# Patient Record
Sex: MALE | Employment: OTHER | URBAN - METROPOLITAN AREA
[De-identification: names, ages, dates, MRNs, and addresses within clinical notes are randomized per-mention and may not be internally consistent; named-entity substitution may affect disease eponyms.]

---

## 2018-08-30 ENCOUNTER — HOSPITAL ENCOUNTER (OUTPATIENT)
Dept: ULTRASOUND IMAGING | Age: 28
Discharge: HOME | End: 2018-08-30
Attending: UROLOGY | Admitting: UROLOGY

## 2018-08-30 DIAGNOSIS — N21.8 CALCULUS OF OTHER LOWER URINARY TRACT LOCATION: ICD-10-CM

## 2018-08-30 DIAGNOSIS — N20.0 RENAL CALCULI: ICD-10-CM

## 2022-04-05 ENCOUNTER — OFFICE VISIT (OUTPATIENT)
Dept: ORTHOPEDIC SURGERY | Age: 32
End: 2022-04-05
Payer: MEDICARE

## 2022-04-05 VITALS
HEIGHT: 69 IN | RESPIRATION RATE: 19 BRPM | OXYGEN SATURATION: 98 % | HEART RATE: 95 BPM | BODY MASS INDEX: 26.66 KG/M2 | WEIGHT: 180 LBS

## 2022-04-05 DIAGNOSIS — M76.822 TIBIAL TENDONITIS, POSTERIOR, LEFT: Primary | ICD-10-CM

## 2022-04-05 PROCEDURE — G8419 CALC BMI OUT NRM PARAM NOF/U: HCPCS | Performed by: ORTHOPAEDIC SURGERY

## 2022-04-05 PROCEDURE — G8427 DOCREV CUR MEDS BY ELIG CLIN: HCPCS | Performed by: ORTHOPAEDIC SURGERY

## 2022-04-05 PROCEDURE — 1036F TOBACCO NON-USER: CPT | Performed by: ORTHOPAEDIC SURGERY

## 2022-04-05 PROCEDURE — 99203 OFFICE O/P NEW LOW 30 MIN: CPT | Performed by: ORTHOPAEDIC SURGERY

## 2022-04-05 RX ORDER — DIVALPROEX SODIUM 500 MG/1
1000 TABLET, DELAYED RELEASE ORAL DAILY
COMMUNITY

## 2022-04-05 NOTE — PROGRESS NOTES
Patient presents to the office with left foot pain. Pt stated this has been onset for about a month and has progressively gotten worse. Pt has been bracing and has helped mildly with the increased pain throughout the day. Pt stated the pain is on the medial aspect of his ankle and describes the pain as aching and throbbing at times. Pt does not recall any falls or injuries to the ankle.

## 2022-04-05 NOTE — PATIENT INSTRUCTIONS
Continue weight-bearing as tolerated. Continue range of motion exercises as instructed. Ice and elevate as needed. Tylenol or Motrin for pain. Continue wearing the brace. Recommend getting over the counter Vitamin D supplements. Follow up in 6 weeks.

## 2022-04-11 PROBLEM — M76.822 TIBIAL TENDONITIS, POSTERIOR, LEFT: Status: ACTIVE | Noted: 2022-04-11

## 2022-04-11 ASSESSMENT — ENCOUNTER SYMPTOMS
EYE PAIN: 0
EYE REDNESS: 0
SHORTNESS OF BREATH: 0
VOMITING: 0
COLOR CHANGE: 0
WHEEZING: 0
CHEST TIGHTNESS: 0

## 2022-04-11 NOTE — PROGRESS NOTES
Examination:  General Exam:  Vitals: Pulse 95   Resp 19   Ht 5' 9\" (1.753 m)   Wt 180 lb (81.6 kg)   SpO2 98%   BMI 26.58 kg/m²    Physical Exam  Vitals and nursing note reviewed. Constitutional:       Appearance: Normal appearance. HENT:      Head: Normocephalic and atraumatic. Eyes:      Conjunctiva/sclera: Conjunctivae normal.      Pupils: Pupils are equal, round, and reactive to light. Pulmonary:      Effort: Pulmonary effort is normal.   Musculoskeletal:      Cervical back: Normal range of motion. Left knee: No swelling, effusion or ecchymosis. Normal range of motion. No tenderness. Right ankle: No swelling, deformity, ecchymosis or lacerations. No tenderness. Normal range of motion. Normal pulse. Right Achilles Tendon: Normal.      Comments: Left lower extremity:  There is mild to moderate tenderness to palpation along the posterior medial aspect of the ankle along the course of the posterior tibial tendon. Strength is 4+ out of 5 with ankle plantarflexion and inversion, 5 out of 5 with dorsiflexion and eversion. There is mild swelling along the posterior medial aspect of the ankle. Active range of motion is intact for ankle plantarflexion and dorsiflexion as well as eversion and inversion of the hindfoot. Skin is intact. 2+ DP pulse. Sensation is intact to light touch. Unable to perform single heel rise. Mild pes planus deformity with weightbearing. The midfoot and hindfoot are supple with no fixed deformities     Skin:     General: Skin is warm and dry. Neurological:      Mental Status: He is alert and oriented to person, place, and time.    Psychiatric:         Mood and Affect: Mood normal.         Behavior: Behavior normal.            Diagnostic testing:  X-ray images were reviewed by myself and discussed with the patient:  Narrative   3 views of the left foot show normal alignment of the articulations of the    foot and ankle.  No evidence of fracture or acute abnormality.  No soft    tissue swelling.  Normal bone density.       Impression: Normal left foot         Office Procedures:  No orders of the defined types were placed in this encounter. Assessment and Plan  1. Left posterior tibial tendinitis    X-rays show no abnormalities. His exam is consistent with posterior tibial tendinitis. We discussed treatment options for his inflammatory condition. I recommended supportive shoes, arch support inserts, rest, stretching, avoidance of impact activities, anti-inflammatory medications, and ice as needed. He will continue with his bracing. Follow-up in 6 weeks for check of his response to conservative treatments. If he is not improving we may consider advanced imaging at that time.     Electronically signed by Alaina Quevedo MD on 4/11/2022 at 2:29 PM

## 2022-04-18 ENCOUNTER — TELEPHONE (OUTPATIENT)
Dept: ORTHOPEDIC SURGERY | Age: 32
End: 2022-04-18

## 2022-04-18 DIAGNOSIS — M76.822 TIBIAL TENDONITIS, POSTERIOR, LEFT: ICD-10-CM

## 2022-04-18 DIAGNOSIS — M79.672 LEFT FOOT PAIN: Primary | ICD-10-CM

## 2022-04-18 NOTE — TELEPHONE ENCOUNTER
Pt called into office and left VM stating he's experiencing increased pain level in L foot. Asking for recommendations or if he should get MRI scheduled.   Please advise pt ph. #[phone]

## 2022-04-20 NOTE — TELEPHONE ENCOUNTER
Please order MRI of ankle to include metatarsals.  I'm worried a foot MRI will miss too much of the hindfoot

## 2022-08-04 ENCOUNTER — HOSPITAL ENCOUNTER (OUTPATIENT)
Dept: MRI IMAGING | Age: 32
Discharge: HOME | End: 2022-08-04
Payer: MEDICARE

## 2022-08-04 DIAGNOSIS — M79.672 LEFT FOOT PAIN: ICD-10-CM

## 2022-08-04 DIAGNOSIS — M76.822 TIBIAL TENDONITIS, POSTERIOR, LEFT: ICD-10-CM

## 2022-08-04 PROCEDURE — 73721 MRI JNT OF LWR EXTRE W/O DYE: CPT

## 2022-08-10 ENCOUNTER — HOSPITAL ENCOUNTER (OUTPATIENT)
Dept: SLEEP CENTER | Age: 32
Discharge: HOME | End: 2022-08-10
Payer: MEDICARE

## 2022-08-10 VITALS — BODY MASS INDEX: 26.66 KG/M2 | HEIGHT: 69 IN | WEIGHT: 180 LBS

## 2022-08-10 DIAGNOSIS — G47.10 HYPERSOMNIA: ICD-10-CM

## 2022-08-10 DIAGNOSIS — E66.3 OVERWEIGHT (BMI 25.0-29.9): ICD-10-CM

## 2022-08-10 DIAGNOSIS — G47.33 OSA (OBSTRUCTIVE SLEEP APNEA): ICD-10-CM

## 2022-08-10 PROCEDURE — 99211 OFF/OP EST MAY X REQ PHY/QHP: CPT

## 2022-08-10 PROCEDURE — 99204 OFFICE O/P NEW MOD 45 MIN: CPT | Performed by: INTERNAL MEDICINE

## 2022-08-10 ASSESSMENT — SLEEP AND FATIGUE QUESTIONNAIRES
HOW LIKELY ARE YOU TO NOD OFF OR FALL ASLEEP WHILE SITTING AND TALKING TO SOMEONE: 1
HOW LIKELY ARE YOU TO NOD OFF OR FALL ASLEEP WHILE SITTING QUIETLY AFTER LUNCH WITHOUT ALCOHOL: 1
ESS TOTAL SCORE: 11
HOW LIKELY ARE YOU TO NOD OFF OR FALL ASLEEP WHEN YOU ARE A PASSENGER IN A CAR FOR AN HOUR WITHOUT A BREAK: 2
HOW LIKELY ARE YOU TO NOD OFF OR FALL ASLEEP IN A CAR, WHILE STOPPED FOR A FEW MINUTES IN TRAFFIC: 0
HOW LIKELY ARE YOU TO NOD OFF OR FALL ASLEEP WHILE WATCHING TV: 1
HOW LIKELY ARE YOU TO NOD OFF OR FALL ASLEEP WHILE SITTING INACTIVE IN A PUBLIC PLACE: 1
HOW LIKELY ARE YOU TO NOD OFF OR FALL ASLEEP WHILE SITTING AND READING: 2
HOW LIKELY ARE YOU TO NOD OFF OR FALL ASLEEP WHILE LYING DOWN TO REST IN THE AFTERNOON WHEN CIRCUMSTANCES PERMIT: 3

## 2022-08-10 NOTE — CONSULTS
Anjum Schaffer MD, Elvi Hogan MD, Suleiman Hendrickson MD, [facility]      [address], [address] [facility]   PH: [phone]  F: [phone]     Subjective:     Patient ID: Rikki Bautista is a 36 y.o. male, referred to the sleep center for   Chief Complaint   Patient presents with    Snoring   .     Referring physician:  Larry Chun MD    History:has been referred for the FALLON    Symptoms:   [x]  Snoring                                                                    []  Dry Mouth  []  Choking                                                                   [x]  Morning Headaches  []  Gasping for Air                                                        []  Trouble Falling asleep  [x]  Tired during the daytime                                         []  Trouble Staying Asleep  [x]  Tired when you wake up                                         [x]  Weight Gain in Last 5 Years  []  Wake up frequently at night                                    []  Weight Loss in Last 5 Years  []  Shortness Of Breath                                               []  Shift Worker  []  Coughing                                                                []  Smoker (Previous or Current)  []  Chest Pain                                                              []  Anxiety  []  Trouble keeping your legs still at night                   []  Depression  []  Kicking your legs in your sleep                               []  Insomnia     [x] Palpitation  [x]   Stop breathing      []  Other:     Significant Co-morbidities:  []  Congestive Heart Failure     []  COPD         []  Stroke (Past 30 Days)      []  Supplemental Oxygen Usage       []  Cognitive Impairment      []  Neuromuscular Problems  []  Epilepsy/Neurological Disorders           Social History     Socioeconomic History    Marital status: Single     Spouse name: Not on file Number of children: Not on file    Years of education: Not on file    Highest education level: Not on file   Occupational History    Not on file   Tobacco Use    Smoking status: Never    Smokeless tobacco: Never   Vaping Use    Vaping Use: Never used   Substance and Sexual Activity    Alcohol use: Not Currently    Drug use: Never    Sexual activity: Not on file   Other Topics Concern    Not on file   Social History Narrative    Not on file     Social Determinants of Health     Financial Resource Strain: Not on file   Food Insecurity: Not on file   Transportation Needs: Not on file   Physical Activity: Not on file   Stress: Not on file   Social Connections: Not on file   Intimate Partner Violence: Not on file   Housing Stability: Not on file       Prior to Admission medications    Medication Sig Start Date End Date Taking? Authorizing Provider   divalproex (DEPAKOTE) 500 MG DR tablet Take 1,000 mg by mouth daily   Yes Historical Provider, MD       Allergies as of 08/10/2022    (No Known Allergies)       Patient Active Problem List   Diagnosis    Tibial tendonitis, posterior, left    FALLON (obstructive sleep apnea)    Overweight (BMI 25.0-29. 9)    Hypersomnia       Past Medical History:   Diagnosis Date    Asthma        No past surgical history on file. Family History   Problem Relation Age of Onset    Osteoporosis Mother     Cancer Paternal Grandfather          Objective:   Ht 5' 9\" (1.753 m)   Wt 180 lb (81.6 kg)   BMI 26.58 kg/m²   Body mass index is 26.58 kg/m².   Sleep Medicine 8/10/2022   Sitting and reading 2   Watching TV 1   Sitting, inactive in a public place (e.g. a theatre or a meeting) 1   As a passenger in a car for an hour without a break 2   Lying down to rest in the afternoon when circumstances permit 3   Sitting and talking to someone 1   Sitting quietly after a lunch without alcohol 1   In a car, while stopped for a few minutes in traffic 0   Total score 11   Neck circumference (Inches) 16 Mallampati 3    Vitals:    08/10/22 1429   Weight: 180 lb (81.6 kg)   Height: 5' 9\" (1.753 m)     Neck circumference (Inches): 16  Inches  [place] - Total score: 11    Gen: No distress. Eyes: PERRL. No sclera icterus. No conjunctival injection. ENT: No discharge. Pharynx clear. External appearance of ears and nose normal.  Neck: Trachea midline. No obvious mass. Resp: No accessory muscle use. No crackles. No wheezes. No rhonchi. No dullness on percussion. CV: Regular rate. Regular rhythm. No murmur or rub. No edema. GI: Non-tender. Non-distended. No hernia. Skin: Warm, dry, normal texture and turgor. No nodule on exposed extremities. Lymph: No cervical LAD. No supraclavicular LAD. M/S: No cyanosis. No clubbing. No joint deformity. Psych: Oriented x 3. No anxiety. Awake. Alert. Intact judgement and insight. Mallampati Airway Classification:   []1 []2 [x]3 []4        Assessment and Plan     Diagnosis:    Problem List          Respiratory    FALLON (obstructive sleep apnea)      He has symptoms of FALLON  Advised to go for the PSG  Loose weight         Relevant Orders    Home Sleep Study       Other    Overweight (BMI 25.0-29. 9)      Advised to loose weight         Hypersomnia      Advised to go for the HST  Loose weight                  Additional Plan:     [x]  Sleep hygiene/ relaxation methods & CBTi principles review with patient   [x]  Avoid supine/back sleep until sleep study   [x]  Driving precautions   [x]  Medical consequences of untreated FALLON   [x]  Weight loss recommendations   [x]  Diet recommendations   [x]  Exercise   []  Advised to quit smoking       []  PFT referral   []  Bariatric Program referral      Follow-Up:    No follow-ups on file.     Electronically signed by Giovanni Ramos MD on 8/10/2022 at 2:40 PM

## 2022-08-29 ENCOUNTER — HOSPITAL ENCOUNTER (OUTPATIENT)
Dept: SLEEP CENTER | Age: 32
Discharge: HOME | End: 2022-08-29
Payer: MEDICARE

## 2022-08-29 DIAGNOSIS — G47.33 OSA (OBSTRUCTIVE SLEEP APNEA): ICD-10-CM

## 2022-08-29 PROCEDURE — 95806 SLEEP STUDY UNATT&RESP EFFT: CPT | Performed by: INTERNAL MEDICINE

## 2022-08-29 PROCEDURE — G0398 HOME SLEEP TEST/TYPE 2 PORTA: HCPCS

## 2022-08-29 NOTE — PROGRESS NOTES
Eusebio [facility]  1990  arrived at [address] on 8/29/2022 for set up and instruction of home sleep study with the [facility] unit. he was instructed on proper set-up and operation of HST unit. Written instructions with set up diagram given for reference and reinforcement of verbal instruction and demonstration. he was able to return demonstration appropriately. No home environment, vision, dexterity, comprehension concerns with this patient based on completed forms and patient interactions. Patient will return unit after 2 nights as instructed.     Electronically signed by Johan Betancourt on 8/29/2022 at 2:40 PM

## 2022-09-15 ENCOUNTER — HOSPITAL ENCOUNTER (OUTPATIENT)
Dept: SLEEP CENTER | Age: 32
Discharge: HOME | End: 2022-09-15
Payer: MEDICARE

## 2022-09-15 DIAGNOSIS — G47.10 HYPERSOMNIA: ICD-10-CM

## 2022-09-15 DIAGNOSIS — E66.3 OVERWEIGHT (BMI 25.0-29.9): ICD-10-CM

## 2022-09-15 DIAGNOSIS — G47.33 OSA (OBSTRUCTIVE SLEEP APNEA): ICD-10-CM

## 2022-09-15 PROCEDURE — 99214 OFFICE O/P EST MOD 30 MIN: CPT | Performed by: INTERNAL MEDICINE

## 2022-09-15 PROCEDURE — 9990000010 HC NO CHARGE VISIT

## 2022-09-15 ASSESSMENT — ENCOUNTER SYMPTOMS
BACK PAIN: 0
ABDOMINAL PAIN: 0
SHORTNESS OF BREATH: 0
ABDOMINAL DISTENTION: 0
COUGH: 0
EYE DISCHARGE: 0
EYE ITCHING: 0

## 2022-09-15 NOTE — PROGRESS NOTES
Shahla Bring  1990  Referring Provider: Rebecca Meza MD    Subjective:     Chief Complaint   Patient presents with    Sleep Apnea       HPI  Susan Nicholson is a 29 y.o. male has come back as a follow up. He had a HST done on 08/29/22 and it showed that he has FALLON with an AHI 9 and desat to 77%. He has no loss of weight. He is still sleepy and tired during the day time. Current Outpatient Medications   Medication Sig Dispense Refill    divalproex (DEPAKOTE) 500 MG DR tablet Take 1,000 mg by mouth daily       No current facility-administered medications for this encounter. No Known Allergies    Past Medical History:   Diagnosis Date    Asthma        No past surgical history on file. Social History     Socioeconomic History    Marital status: Single   Tobacco Use    Smoking status: Never    Smokeless tobacco: Never   Vaping Use    Vaping Use: Never used   Substance and Sexual Activity    Alcohol use: Not Currently    Drug use: Never       Review of Systems   Constitutional:  Positive for fatigue. HENT:  Negative for congestion and postnasal drip. Eyes:  Negative for discharge and itching. Respiratory:  Negative for cough and shortness of breath. Cardiovascular:  Negative for chest pain and leg swelling. Gastrointestinal:  Negative for abdominal distention and abdominal pain. Endocrine: Negative for cold intolerance and heat intolerance. Genitourinary:  Negative for enuresis and frequency. Musculoskeletal:  Negative for arthralgias and back pain. Allergic/Immunologic: Negative for environmental allergies and food allergies. Neurological:  Negative for light-headedness and headaches. Hematological:  Negative for adenopathy. Psychiatric/Behavioral:  Negative for agitation and behavioral problems. Objective: There were no vitals taken for this visit. There is no height or weight on file to calculate BMI.   Sleep Medicine 8/10/2022   Sitting and reading 2   Watching TV 1   Sitting, inactive in a public place (e.g. a theatre or a meeting) 1   As a passenger in a car for an hour without a break 2   Lying down to rest in the afternoon when circumstances permit 3   Sitting and talking to someone 1   Sitting quietly after a lunch without alcohol 1   In a car, while stopped for a few minutes in traffic 0   [place] Sleepiness Score 11   Neck circumference (Inches) 16     Mallampati 3    Physical Exam  Vitals reviewed. Constitutional:       Appearance: Normal appearance. HENT:      Head: Normocephalic and atraumatic. Nose: Nose normal.      Mouth/Throat:      Mouth: Mucous membranes are moist.   Eyes:      Extraocular Movements: Extraocular movements intact. Pupils: Pupils are equal, round, and reactive to light. Cardiovascular:      Rate and Rhythm: Normal rate and regular rhythm. Pulses: Normal pulses. Heart sounds: Normal heart sounds. Pulmonary:      Effort: Pulmonary effort is normal.      Breath sounds: Normal breath sounds. Abdominal:      General: Abdomen is flat. Palpations: Abdomen is soft. Musculoskeletal:         General: Normal range of motion. Cervical back: Normal range of motion and neck supple. Skin:     General: Skin is warm and dry. Neurological:      General: No focal deficit present. Mental Status: He is alert and oriented to person, place, and time. Psychiatric:         Mood and Affect: Mood normal.         Behavior: Behavior normal.       Radiology: None    Assessment and Plan     Problem List          Respiratory    FALLON (obstructive sleep apnea)     He has mild FALLON with EDS  Advised to go for the CPAP titration study  Loose weight          Relevant Orders    DME Order for CPAP as OP    Sleep Study with PAP Titration       Other    Overweight (BMI 25.0-29. 9)      Advised to loose weight         Hypersomnia      He has mild FALLON with EDS  Advised to go for the CPAP titration study  Loose weight Follow-Up:    No follow-ups on file.      Progress notes sent to the referring Provider    Josh Hines MD MD  9/15/2022  11:03 AM